# Patient Record
Sex: FEMALE | Race: BLACK OR AFRICAN AMERICAN | NOT HISPANIC OR LATINO | Employment: UNEMPLOYED | URBAN - METROPOLITAN AREA
[De-identification: names, ages, dates, MRNs, and addresses within clinical notes are randomized per-mention and may not be internally consistent; named-entity substitution may affect disease eponyms.]

---

## 2019-09-23 ENCOUNTER — SOCIAL WORK (OUTPATIENT)
Dept: ADMINISTRATIVE | Facility: OTHER | Age: 29
End: 2019-09-23

## 2019-09-23 NOTE — PROGRESS NOTES
SW met with pt regarding initial OB assessment. Pt stated this is her 3rd pregnancy/0-miscarriage. Pt stated lives with her parents/child and able to perform ADL's independently. Pt stated support system is her mother/Brandy/FO[place]. Pt stated has medicaid([facility]). Pt stated does not have WIC. SW provide pt with information on other community resources.SW faxed and scanned pt's notification of pregnancy into epic.  No other needs identified at this time.    Jeannette Cesar,MSW  Pager#[phone]

## 2019-09-30 PROBLEM — J45.909 ASTHMA AFFECTING PREGNANCY IN FIRST TRIMESTER: Status: ACTIVE | Noted: 2019-09-30

## 2019-09-30 PROBLEM — Z80.3 FAMILY HISTORY OF BREAST CANCER: Status: ACTIVE | Noted: 2019-09-30

## 2019-09-30 PROBLEM — Z80.41 FAMILY HISTORY OF OVARIAN CANCER: Status: ACTIVE | Noted: 2019-09-30

## 2019-09-30 PROBLEM — R74.01 ELEVATED ALT MEASUREMENT: Status: ACTIVE | Noted: 2019-09-30

## 2019-09-30 PROBLEM — O99.511 ASTHMA AFFECTING PREGNANCY IN FIRST TRIMESTER: Status: ACTIVE | Noted: 2019-09-30

## 2019-09-30 PROBLEM — E66.01 CLASS 3 SEVERE OBESITY IN ADULT: Status: ACTIVE | Noted: 2019-09-30

## 2019-09-30 PROBLEM — Z34.91 SUPERVISION OF NORMAL PREGNANCY IN FIRST TRIMESTER: Status: ACTIVE | Noted: 2019-09-30

## 2019-10-28 PROBLEM — Z3A.12 12 WEEKS GESTATION OF PREGNANCY: Status: ACTIVE | Noted: 2019-10-28

## 2019-11-07 LAB
ANA INTERPRETATION: NORMAL
ANA SER-ACNC: NEGATIVE
FERRITIN: 154.5 NG/ML (ref 8–388)
GBM AB SER-ACNC: <0.2 AI
HAV IGM SERPL QL IA: NEGATIVE
HBV CORE IGM SERPL QL IA: NEGATIVE
HBV SURFACE AG SERPL QL IA: NEGATIVE
HCV AB SER-ACNC: NORMAL
HEPATITIS B CORE TOTAL ANTIBODY: NEGATIVE
LDH, TOTAL: 189 U/L (ref 84–246)
Lab: <0.2 AI
MYELOPEROXIDASE AB: <0.2 AI

## 2019-11-08 LAB
CERULOPLASMIN SERPL-MCNC: 46.5 MG/DL (ref 16–45)
IGG1 SER-MCNC: 926 MG/DL (ref 382–929)
IGG2 SER-MCNC: 386 MG/DL (ref 242–700)
IGG3 SER-MCNC: 67 MG/DL (ref 22–176)
IGG4 SER-MCNC: 33 MG/DL (ref 4–86)
Lab: 1404 MG/DL (ref 700–1600)

## 2019-11-09 LAB — MITOCHONDRIAL ANTIBODY: 8.5 UNITS

## 2019-11-13 LAB
HEPATITIS DELTA ANTIBODY: NEGATIVE
SMOOTH MUSCLE ANTIBODY: 12 UNITS (ref 0–19)

## 2019-12-06 PROBLEM — Z3A.18 18 WEEKS GESTATION OF PREGNANCY: Status: ACTIVE | Noted: 2019-10-28

## 2019-12-06 PROBLEM — B37.9 YEAST INFECTION: Status: ACTIVE | Noted: 2019-12-06

## 2020-01-15 PROBLEM — Z3A.24 24 WEEKS GESTATION OF PREGNANCY: Status: ACTIVE | Noted: 2019-10-28

## 2020-01-15 PROBLEM — Z34.92 ENCOUNTER FOR SUPERVISION OF NORMAL PREGNANCY IN SECOND TRIMESTER: Status: ACTIVE | Noted: 2019-09-30

## 2020-02-05 ENCOUNTER — SOCIAL WORK (OUTPATIENT)
Dept: ADMINISTRATIVE | Facility: OTHER | Age: 30
End: 2020-02-05

## 2020-02-05 PROBLEM — Z3A.27 27 WEEKS GESTATION OF PREGNANCY: Status: ACTIVE | Noted: 2019-10-28

## 2020-02-05 PROBLEM — B37.9 YEAST INFECTION: Status: RESOLVED | Noted: 2019-12-06 | Resolved: 2020-02-05

## 2020-02-05 NOTE — PROGRESS NOTES
SW met with pt regarding counseling for depression;per pt would like resource on depression   SW provide pt with community resources in the [facility] on depression. No other needs identified at this time.    Jeannette Cesar MSW  Pager#[phone]

## 2020-02-11 PROBLEM — O26.892 ABDOMINAL PAIN DURING PREGNANCY IN SECOND TRIMESTER: Status: ACTIVE | Noted: 2020-02-11

## 2020-02-11 PROBLEM — R10.9 ABDOMINAL PAIN DURING PREGNANCY IN SECOND TRIMESTER: Status: ACTIVE | Noted: 2020-02-11

## 2020-02-19 PROBLEM — O99.513 ASTHMA AFFECTING PREGNANCY IN THIRD TRIMESTER: Status: ACTIVE | Noted: 2019-09-30

## 2020-02-19 PROBLEM — O98.813 CHLAMYDIA INFECTION AFFECTING PREGNANCY IN THIRD TRIMESTER: Status: ACTIVE | Noted: 2020-02-19

## 2020-02-19 PROBLEM — Z3A.29 29 WEEKS GESTATION OF PREGNANCY: Status: ACTIVE | Noted: 2019-10-28

## 2020-02-19 PROBLEM — A74.9 CHLAMYDIA INFECTION AFFECTING PREGNANCY IN THIRD TRIMESTER: Status: ACTIVE | Noted: 2020-02-19

## 2020-02-19 PROBLEM — Z34.93 ENCOUNTER FOR SUPERVISION OF NORMAL PREGNANCY IN THIRD TRIMESTER: Status: ACTIVE | Noted: 2019-09-30

## 2020-02-19 PROBLEM — R10.9 ABDOMINAL PAIN DURING PREGNANCY IN SECOND TRIMESTER: Status: RESOLVED | Noted: 2020-02-11 | Resolved: 2020-02-19

## 2020-02-19 PROBLEM — O26.892 ABDOMINAL PAIN DURING PREGNANCY IN SECOND TRIMESTER: Status: RESOLVED | Noted: 2020-02-11 | Resolved: 2020-02-19

## 2020-03-01 PROBLEM — Z3A.30 30 WEEKS GESTATION OF PREGNANCY: Status: ACTIVE | Noted: 2019-10-28

## 2020-03-01 PROBLEM — J02.9 PHARYNGITIS: Status: ACTIVE | Noted: 2020-03-01

## 2020-03-01 PROBLEM — R07.0 THROAT PAIN: Status: ACTIVE | Noted: 2020-03-01

## 2020-03-01 PROBLEM — R09.89 SYMPTOMS OF UPPER RESPIRATORY INFECTION (URI): Status: ACTIVE | Noted: 2020-03-01

## 2020-03-11 PROBLEM — Z3A.32 32 WEEKS GESTATION OF PREGNANCY: Status: ACTIVE | Noted: 2019-10-28

## 2020-04-01 PROBLEM — Z3A.35 35 WEEKS GESTATION OF PREGNANCY: Status: ACTIVE | Noted: 2019-10-28

## 2020-04-01 PROBLEM — Z3A.33 33 WEEKS GESTATION OF PREGNANCY: Status: ACTIVE | Noted: 2019-10-28

## 2020-04-09 ENCOUNTER — NURSE TRIAGE (OUTPATIENT)
Dept: ADMINISTRATIVE | Facility: CLINIC | Age: 30
End: 2020-04-09

## 2020-04-09 NOTE — TELEPHONE ENCOUNTER
OB COVID-19 TRIAGE NOTE    Dmitriy Todd is a 29 y.o. female   at 36w1d in a pregnancy complicated by asthma and chlamydia affecting pregnancy in third trimester.    Patient is calling regarding [facility] policy and abd pain today. Patient states that yesterday she went to her OBGYN appt, but was turned away due to her positive exposure to COVID. She was told that she could not be seen in person until she has tested negative for COVID19. She called this line yesterday for assistance. She endorses constant lower abd pain for last 3 days, worse with fetal movement and with walking and denies fever, chills, cough, shortness of breath.   Has had potential COVID-19 exposure by step dad last exposure .  Patient reports fetal movement and denies  contractions, loss of fluids and vaginal bleeding.        Assessment:     Patient Active Problem List   Diagnosis    Encounter for supervision of normal pregnancy in third trimester    Asthma affecting pregnancy in third trimester    Elevated ALT measurement    Family history of breast cancer    Family history of ovarian cancer    Class 3 severe obesity in adult    35 weeks gestation of pregnancy    Chlamydia infection affecting pregnancy in third trimester    Symptoms of upper respiratory infection (URI)     Abdominal cramping in third trimester pregnancy     Plan:   1. [facility] in [place] contacted for clarification on policy. Patient rescheduled for in-person appointment tomorrow at 8am. Patient notified of new appointment        Tiffanie Bettencourt  MSY4    Reason for Disposition   [1] COVID-19 EXPOSURE (Close Contact) AND [2] within last 14 days AND [3] NO cough, fever, or breathing difficulty    Additional Information   Negative: [1] Adult has symptoms of COVID-19 (fever, cough, or SOB) AND [2] lab test positive   Negative: [1] Adult has symptoms of COVID-19 (fever, cough or SOB) AND [2] major community spread where patient lives AND [3]  testing not being done for mild symptoms   Negative: [1] Difficulty breathing (shortness of breath) occurs AND [2] onset > 14 days after COVID-19 EXPOSURE (Close Contact) AND [3] no major community spread   Negative: [1] Cough occurs AND [2] onset > 14 days after COVID-19 EXPOSURE AND [3] no major community spread   Negative: [1] Common cold symptoms AND [2] onset > 14 days after COVID-19 EXPOSURE AND [3] no major community spread   Negative: [1] Difficulty breathing occurs AND [2] within 14 days of COVID-19 EXPOSURE (Close Contact)   Negative: Patient sounds very sick or weak to the triager   Negative: [1] Fever (or feeling feverish) OR cough AND [2] within 14 Days of COVID-19 EXPOSURE (Close Contact)   Negative: [1] Fever (or feeling feverish) OR cough occurs AND [2] within 14 days of travel from another country (international travel)   Negative: [1] Fever (or feeling feverish) OR cough occurs AND [2] within 14 days of travel from a city or area with major community spread   Negative: [1] Fever (or feeling feverish) OR cough occurs AND [2] living in area with major community spread AND [3] testing being done in the community for symptoms   Negative: [1] Mild body aches, chills, diarrhea, headache, runny nose, or sore throat AND [2] within 14 days of COVID-19 EXPOSURE   Negative: [1] COVID-19 EXPOSURE within last 14 days AND [2] NO cough, fever, or breathing difficulty AND [3] 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 personal protective equipment (i.e., a respirator-N95 mask, eye protection, gloves, and gown)   Negative: Passed out (i.e., fainted, collapsed and was not responding)   Negative: Shock suspected (e.g., cold/pale/clammy skin, too weak to stand, low BP, rapid pulse)   Negative: Difficult to awaken or acting confused (e.g., disoriented, slurred speech)   Negative: SEVERE abdominal pain (e.g., excruciating), constant, and present > 1 hour   Negative: Severe  bleeding (e.g., continuous red blood from vagina, or large blood clots)   Negative: Sounds like a life-threatening emergency to the triager   Negative: Abdominal pain and pregnant < 20 weeks   Negative: Vomiting red blood or black (coffee ground) material    Protocols used: CORONAVIRUS (COVID-19) EXPOSURE-A-[place], PREGNANCY - ABDOMINAL PAIN GREATER THAN 20 WEEKS EGA-A-[place]

## 2020-04-10 PROBLEM — O26.893 ABDOMINAL PAIN DURING PREGNANCY IN THIRD TRIMESTER: Status: ACTIVE | Noted: 2020-02-11

## 2020-04-10 PROBLEM — Z3A.36 36 WEEKS GESTATION OF PREGNANCY: Status: ACTIVE | Noted: 2019-10-28

## 2020-04-10 PROBLEM — R09.89 SYMPTOMS OF UPPER RESPIRATORY INFECTION (URI): Status: RESOLVED | Noted: 2020-03-01 | Resolved: 2020-04-10

## 2020-04-10 PROBLEM — O99.213 OBESITY AFFECTING PREGNANCY IN THIRD TRIMESTER: Status: ACTIVE | Noted: 2019-09-30

## 2020-04-10 PROBLEM — Z30.09 GENERAL COUNSELING AND ADVICE ON FEMALE CONTRACEPTION: Status: ACTIVE | Noted: 2020-04-10

## 2020-04-10 PROBLEM — R74.01 ELEVATED ALT MEASUREMENT: Status: RESOLVED | Noted: 2019-09-30 | Resolved: 2020-04-10

## 2020-04-20 PROBLEM — Z3A.36 36 WEEKS GESTATION OF PREGNANCY: Status: RESOLVED | Noted: 2019-10-28 | Resolved: 2020-04-20

## 2020-04-20 PROBLEM — Z3A.37 37 WEEKS GESTATION OF PREGNANCY: Status: ACTIVE | Noted: 2020-04-20

## 2020-04-27 PROBLEM — Z3A.38 38 WEEKS GESTATION OF PREGNANCY: Status: ACTIVE | Noted: 2020-04-20

## 2020-05-04 PROBLEM — Z3A.39 39 WEEKS GESTATION OF PREGNANCY: Status: ACTIVE | Noted: 2020-04-20

## 2020-05-05 PROBLEM — O42.90 PROM (PREMATURE RUPTURE OF MEMBRANES): Status: ACTIVE | Noted: 2020-05-05

## 2020-05-06 PROBLEM — Z97.5 NEXPLANON IN PLACE: Status: ACTIVE | Noted: 2020-05-06

## 2020-06-04 ENCOUNTER — SOCIAL WORK (OUTPATIENT)
Dept: ADMINISTRATIVE | Facility: OTHER | Age: 30
End: 2020-06-04

## 2021-04-12 PROBLEM — Z3A.39 39 WEEKS GESTATION OF PREGNANCY: Status: RESOLVED | Noted: 2020-04-20 | Resolved: 2021-04-12

## 2022-04-18 PROBLEM — S44.01XA INJURY OF RIGHT ULNAR NERVE AT UPPER ARM LEVEL: Status: ACTIVE | Noted: 2022-04-18

## 2022-04-18 PROBLEM — S41.119A ARM LACERATION: Status: ACTIVE | Noted: 2022-04-18

## 2022-04-19 PROBLEM — F32.A DEPRESSION: Status: ACTIVE | Noted: 2022-04-19

## 2022-04-28 PROBLEM — F33.2 SEVERE EPISODE OF RECURRENT MAJOR DEPRESSIVE DISORDER, WITHOUT PSYCHOTIC FEATURES: Status: ACTIVE | Noted: 2022-04-28

## 2022-04-28 PROBLEM — F41.1 GENERALIZED ANXIETY DISORDER: Status: ACTIVE | Noted: 2022-04-28

## 2023-03-28 ENCOUNTER — PATIENT MESSAGE (OUTPATIENT)
Dept: RESEARCH | Facility: HOSPITAL | Age: 33
End: 2023-03-28